# Patient Record
Sex: FEMALE | Race: WHITE | URBAN - METROPOLITAN AREA
[De-identification: names, ages, dates, MRNs, and addresses within clinical notes are randomized per-mention and may not be internally consistent; named-entity substitution may affect disease eponyms.]

---

## 2021-09-14 ENCOUNTER — OFFICE VISIT (OUTPATIENT)
Dept: URBAN - METROPOLITAN AREA CLINIC 23 | Facility: CLINIC | Age: 61
End: 2021-09-14
Payer: COMMERCIAL

## 2021-09-14 DIAGNOSIS — H02.32 BLEPHAROCHALASIS RIGHT LOWER EYELID: Primary | ICD-10-CM

## 2021-09-14 DIAGNOSIS — H02.35 BLEPHAROCHALASIS LEFT LOWER EYELID: ICD-10-CM

## 2021-09-14 PROCEDURE — 92004 COMPRE OPH EXAM NEW PT 1/>: CPT | Performed by: OPTOMETRIST

## 2021-09-14 RX ORDER — TIMOLOL MALEATE 5 MG/ML
0.5 % SOLUTION/ DROPS OPHTHALMIC
Qty: 5 | Refills: 0 | Status: ACTIVE
Start: 2021-09-14

## 2021-09-14 ASSESSMENT — KERATOMETRY
OD: 45.38
OS: 45.25

## 2021-09-14 ASSESSMENT — INTRAOCULAR PRESSURE
OD: 10
OS: 10

## 2021-09-14 NOTE — IMPRESSION/PLAN
Impression: Blepharochalasis right lower eyelid: H02.32. Plan: Informed pt to use cold compresses as needed for relief from the swelling.  Will refer to oculoplastics for a lid eval.

## 2021-09-20 ENCOUNTER — OFFICE VISIT (OUTPATIENT)
Dept: URBAN - METROPOLITAN AREA CLINIC 23 | Facility: CLINIC | Age: 61
End: 2021-09-20
Payer: COMMERCIAL

## 2021-09-20 DIAGNOSIS — H10.011 ACUTE FOLLICULAR CONJUNCTIVITIS, RIGHT EYE: Primary | ICD-10-CM

## 2021-09-20 PROCEDURE — 92012 INTRM OPH EXAM EST PATIENT: CPT | Performed by: OPTOMETRIST

## 2021-09-20 RX ORDER — TOBRAMYCIN AND DEXAMETHASONE 3; 1 MG/ML; MG/ML
SUSPENSION/ DROPS OPHTHALMIC
Qty: 5 | Refills: 0 | Status: ACTIVE
Start: 2021-09-20

## 2021-09-20 ASSESSMENT — KERATOMETRY
OD: 45.00
OS: 45.13

## 2021-09-20 NOTE — IMPRESSION/PLAN
Impression: Acute follicular conjunctivitis, right eye: H10.011. Plan: pt edu. rtc prn. rx tobradex for 1-2 weeks.

## 2021-10-21 ENCOUNTER — OFFICE VISIT (OUTPATIENT)
Dept: URBAN - METROPOLITAN AREA CLINIC 23 | Facility: CLINIC | Age: 61
End: 2021-10-21
Payer: COMMERCIAL

## 2021-10-21 DIAGNOSIS — H02.831 DERMATOCHALASIS OF RIGHT UPPER EYELID: Primary | ICD-10-CM

## 2021-10-21 DIAGNOSIS — H02.841 EDEMA OF RIGHT UPPER EYELID: ICD-10-CM

## 2021-10-21 PROCEDURE — 92002 INTRM OPH EXAM NEW PATIENT: CPT | Performed by: OPHTHALMOLOGY

## 2021-10-21 ASSESSMENT — INTRAOCULAR PRESSURE
OS: 14
OD: 13

## 2021-10-21 NOTE — IMPRESSION/PLAN
Impression: Dermatochalasis of right upper eyelid: H02.831. Right. Condition: self limited, minor problem. Plan: Discussed diagnosis in detail with patient. Discussed treatment options with patient. I do not recommend eyelid surgery at this time, she had previous belpharoplasty in 2007. However she has very mild dermatochalasis of right upper eyelid and minimal on the left upper eyelid.

## 2021-10-21 NOTE — IMPRESSION/PLAN
Impression: Edema of right upper eyelid: H02.841. Right. Condition: self limited, minor problem. Plan: No treatment is required at this time. Patient complains of occasional edema of eyelids after crying. She may try a cold compress to swollen eyelids as needed.